# Patient Record
Sex: FEMALE | Race: WHITE | URBAN - NONMETROPOLITAN AREA
[De-identification: names, ages, dates, MRNs, and addresses within clinical notes are randomized per-mention and may not be internally consistent; named-entity substitution may affect disease eponyms.]

---

## 2019-03-27 ENCOUNTER — DOCTOR'S OFFICE (OUTPATIENT)
Dept: URBAN - NONMETROPOLITAN AREA CLINIC 2 | Facility: CLINIC | Age: 34
Setting detail: OPHTHALMOLOGY
End: 2019-03-27
Payer: COMMERCIAL

## 2019-03-27 ENCOUNTER — RX ONLY (RX ONLY)
Age: 34
End: 2019-03-27

## 2019-03-27 DIAGNOSIS — H52.13: ICD-10-CM

## 2019-03-27 DIAGNOSIS — H16.071: ICD-10-CM

## 2019-03-27 PROCEDURE — 92004 COMPRE OPH EXAM NEW PT 1/>: CPT | Performed by: OPTOMETRIST

## 2019-03-27 ASSESSMENT — REFRACTION_MANIFEST
OD_VA3: 20/
OD_VA2: 20/
OS_SPHERE: -2.00
OD_AXIS: 170
OS_VA3: 20/
OS_VA3: 20/
OD_VA1: 20/
OS_VA2: 20/
OU_VA: 20/20
OD_VA1: 20/20
OS_CYLINDER: -1.00
OD_CYLINDER: -1.00
OS_VA1: 20/20
OD_VA2: 20/
OS_AXIS: 175
OD_SPHERE: -2.00
OD_VA3: 20/
OS_VA1: 20/
OU_VA: 20/
OS_VA2: 20/

## 2019-03-27 ASSESSMENT — KERATOMETRY
OD_K2POWER_DIOPTERS: 44.75
OS_AXISANGLE_DEGREES: 180
OS_K2POWER_DIOPTERS: 45.00
OD_AXISANGLE_DEGREES: 175
OD_K1POWER_DIOPTERS: 42.75
OS_K1POWER_DIOPTERS: 42.50

## 2019-03-27 ASSESSMENT — AXIALLENGTH_DERIVED
OS_AL: 24.5084
OD_AL: 24.5084
OS_AL: 24.8278
OD_AL: 24.561

## 2019-03-27 ASSESSMENT — VISUAL ACUITY
OS_BCVA: 20/200
OD_BCVA: 20/200

## 2019-03-27 ASSESSMENT — REFRACTION_AUTOREFRACTION
OS_SPHERE: -2.50
OD_AXIS: 170
OS_CYLINDER: -1.50
OD_CYLINDER: -1.75
OD_SPHERE: -1.75
OS_AXIS: 176

## 2019-03-27 ASSESSMENT — SPHEQUIV_DERIVED
OS_SPHEQUIV: -3.25
OS_SPHEQUIV: -2.5
OD_SPHEQUIV: -2.5
OD_SPHEQUIV: -2.625

## 2019-03-27 ASSESSMENT — REFRACTION_CURRENTRX
OD_OVR_VA: 20/
OD_OVR_VA: 20/
OS_OVR_VA: 20/
OS_OVR_VA: 20/
OD_OVR_VA: 20/
OS_OVR_VA: 20/

## 2019-03-27 ASSESSMENT — CONFRONTATIONAL VISUAL FIELD TEST (CVF)
OS_FINDINGS: FULL
OD_FINDINGS: FULL

## 2019-03-29 ENCOUNTER — OPTICAL OFFICE (OUTPATIENT)
Dept: URBAN - NONMETROPOLITAN AREA CLINIC 5 | Facility: CLINIC | Age: 34
Setting detail: OPHTHALMOLOGY
End: 2019-03-29
Payer: COMMERCIAL

## 2019-03-29 DIAGNOSIS — H52.223: ICD-10-CM

## 2019-03-29 PROCEDURE — V2020 VISION SVCS FRAMES PURCHASES: HCPCS | Performed by: OPTOMETRIST

## 2019-03-29 PROCEDURE — V2103 SPHEROCYLINDR 4.00D/12-2.00D: HCPCS | Performed by: OPTOMETRIST

## 2024-12-26 ENCOUNTER — HOSPITAL ENCOUNTER (EMERGENCY)
Facility: HOSPITAL | Age: 39
Discharge: HOME | End: 2024-12-26
Attending: EMERGENCY MEDICINE
Payer: COMMERCIAL

## 2024-12-26 ENCOUNTER — APPOINTMENT (EMERGENCY)
Dept: RADIOLOGY | Facility: HOSPITAL | Age: 39
End: 2024-12-26
Payer: COMMERCIAL

## 2024-12-26 VITALS
HEART RATE: 81 BPM | RESPIRATION RATE: 20 BRPM | DIASTOLIC BLOOD PRESSURE: 66 MMHG | SYSTOLIC BLOOD PRESSURE: 131 MMHG | TEMPERATURE: 97.5 F | OXYGEN SATURATION: 100 %

## 2024-12-26 DIAGNOSIS — N12 PYELONEPHRITIS: Primary | ICD-10-CM

## 2024-12-26 DIAGNOSIS — N28.1 RENAL CYST: ICD-10-CM

## 2024-12-26 LAB
ALBUMIN SERPL BCG-MCNC: 3.8 G/DL (ref 3.5–5)
ALP SERPL-CCNC: 56 U/L (ref 34–104)
ALT SERPL W P-5'-P-CCNC: 7 U/L (ref 7–52)
ANION GAP SERPL CALCULATED.3IONS-SCNC: 5 MMOL/L (ref 4–13)
AST SERPL W P-5'-P-CCNC: 10 U/L (ref 5–45)
BACTERIA UR QL AUTO: ABNORMAL /HPF
BASOPHILS # BLD AUTO: 0.05 THOUSANDS/ÂΜL (ref 0–0.1)
BASOPHILS NFR BLD AUTO: 1 % (ref 0–1)
BILIRUB SERPL-MCNC: 0.27 MG/DL (ref 0.2–1)
BILIRUB UR QL STRIP: NEGATIVE
BUN SERPL-MCNC: 10 MG/DL (ref 5–25)
CALCIUM SERPL-MCNC: 8.9 MG/DL (ref 8.4–10.2)
CHLORIDE SERPL-SCNC: 105 MMOL/L (ref 96–108)
CLARITY UR: ABNORMAL
CO2 SERPL-SCNC: 28 MMOL/L (ref 21–32)
COLOR UR: YELLOW
CREAT SERPL-MCNC: 0.92 MG/DL (ref 0.6–1.3)
EOSINOPHIL # BLD AUTO: 0.1 THOUSAND/ÂΜL (ref 0–0.61)
EOSINOPHIL NFR BLD AUTO: 1 % (ref 0–6)
ERYTHROCYTE [DISTWIDTH] IN BLOOD BY AUTOMATED COUNT: 16 % (ref 11.6–15.1)
EXT PREGNANCY TEST URINE: NEGATIVE
EXT. CONTROL: NORMAL
GLUCOSE SERPL-MCNC: 83 MG/DL (ref 65–140)
GLUCOSE UR STRIP-MCNC: NEGATIVE MG/DL
HCT VFR BLD AUTO: 31.3 % (ref 36.5–46.1)
HGB BLD-MCNC: 9.3 G/DL (ref 12–15.4)
HGB UR QL STRIP.AUTO: NEGATIVE
IMM GRANULOCYTES # BLD AUTO: 0.03 THOUSAND/UL (ref 0–0.2)
IMM GRANULOCYTES NFR BLD AUTO: 0 % (ref 0–2)
KETONES UR STRIP-MCNC: NEGATIVE MG/DL
LEUKOCYTE ESTERASE UR QL STRIP: ABNORMAL
LIPASE SERPL-CCNC: 52 U/L (ref 11–82)
LYMPHOCYTES # BLD AUTO: 1.51 THOUSANDS/ÂΜL (ref 0.6–4.47)
LYMPHOCYTES NFR BLD AUTO: 19 % (ref 14–44)
MCH RBC QN AUTO: 25.5 PG (ref 26.8–34.3)
MCHC RBC AUTO-ENTMCNC: 29.7 G/DL (ref 31.4–37.4)
MCV RBC AUTO: 86 FL (ref 82–98)
MONOCYTES # BLD AUTO: 0.61 THOUSAND/ÂΜL (ref 0.17–1.22)
MONOCYTES NFR BLD AUTO: 8 % (ref 4–12)
MUCOUS THREADS UR QL AUTO: ABNORMAL
NEUTROPHILS # BLD AUTO: 5.82 THOUSANDS/ÂΜL (ref 1.85–7.62)
NEUTS SEG NFR BLD AUTO: 71 % (ref 43–75)
NITRITE UR QL STRIP: POSITIVE
NON-SQ EPI CELLS URNS QL MICRO: ABNORMAL /HPF
NRBC BLD AUTO-RTO: 0 /100 WBCS
PH UR STRIP.AUTO: 7 [PH]
PLATELET # BLD AUTO: 297 THOUSANDS/UL (ref 149–390)
PMV BLD AUTO: 9.7 FL (ref 8.9–12.7)
POTASSIUM SERPL-SCNC: 4 MMOL/L (ref 3.5–5.3)
PROT SERPL-MCNC: 6.4 G/DL (ref 6.4–8.4)
PROT UR STRIP-MCNC: ABNORMAL MG/DL
RBC # BLD AUTO: 3.65 MILLION/UL (ref 3.88–5.12)
RBC #/AREA URNS AUTO: ABNORMAL /HPF
SODIUM SERPL-SCNC: 138 MMOL/L (ref 135–147)
SP GR UR STRIP.AUTO: 1.02 (ref 1–1.03)
UROBILINOGEN UR STRIP-ACNC: <2 MG/DL
WBC # BLD AUTO: 8.12 THOUSAND/UL (ref 4.31–10.16)
WBC #/AREA URNS AUTO: ABNORMAL /HPF

## 2024-12-26 PROCEDURE — 80053 COMPREHEN METABOLIC PANEL: CPT

## 2024-12-26 PROCEDURE — 85025 COMPLETE CBC W/AUTO DIFF WBC: CPT

## 2024-12-26 PROCEDURE — 74177 CT ABD & PELVIS W/CONTRAST: CPT

## 2024-12-26 PROCEDURE — 99285 EMERGENCY DEPT VISIT HI MDM: CPT | Performed by: EMERGENCY MEDICINE

## 2024-12-26 PROCEDURE — 81025 URINE PREGNANCY TEST: CPT

## 2024-12-26 PROCEDURE — 99284 EMERGENCY DEPT VISIT MOD MDM: CPT

## 2024-12-26 PROCEDURE — 81001 URINALYSIS AUTO W/SCOPE: CPT

## 2024-12-26 PROCEDURE — 83690 ASSAY OF LIPASE: CPT

## 2024-12-26 PROCEDURE — 96375 TX/PRO/DX INJ NEW DRUG ADDON: CPT

## 2024-12-26 PROCEDURE — 36415 COLL VENOUS BLD VENIPUNCTURE: CPT

## 2024-12-26 PROCEDURE — 96365 THER/PROPH/DIAG IV INF INIT: CPT

## 2024-12-26 RX ORDER — CIPROFLOXACIN 500 MG/1
500 TABLET, FILM COATED ORAL EVERY 12 HOURS SCHEDULED
Qty: 14 TABLET | Refills: 0 | Status: SHIPPED | OUTPATIENT
Start: 2024-12-26 | End: 2025-01-02

## 2024-12-26 RX ORDER — KETOROLAC TROMETHAMINE 30 MG/ML
15 INJECTION, SOLUTION INTRAMUSCULAR; INTRAVENOUS ONCE
Status: COMPLETED | OUTPATIENT
Start: 2024-12-26 | End: 2024-12-26

## 2024-12-26 RX ORDER — CIPROFLOXACIN 2 MG/ML
400 INJECTION, SOLUTION INTRAVENOUS ONCE
Status: COMPLETED | OUTPATIENT
Start: 2024-12-26 | End: 2024-12-26

## 2024-12-26 RX ADMIN — CIPROFLOXACIN 400 MG: 2 INJECTION, SOLUTION INTRAVENOUS at 17:17

## 2024-12-26 RX ADMIN — KETOROLAC TROMETHAMINE 15 MG: 30 INJECTION, SOLUTION INTRAMUSCULAR; INTRAVENOUS at 18:35

## 2024-12-26 RX ADMIN — IOHEXOL 85 ML: 350 INJECTION, SOLUTION INTRAVENOUS at 16:26

## 2024-12-26 NOTE — DISCHARGE INSTRUCTIONS
An incidental finding was noted on your CT.  The majority of renal cysts are benign (not dangerous), however the radiologist has interpreted your CT as:    Cystic lesion with enhancing, thickened septum in the upper pole the right kidney measuring 2 cm. Recommend nonemergent MRI of the abdomen with gadolinium. No obstructive uropathy.     Therefore we recommend that you follow-up with your primary care physician for outpatient abdomen and pelvis MRI.    Please take the prescribed abx and come back If you have any new concerning symptoms.

## 2024-12-26 NOTE — ED PROVIDER NOTES
ED Disposition       None          Assessment & Plan   {Hyperlinks  Risk Stratification - NIHSS - HEART SCORE - Fill out sepsis note and make sure you call [phone] if severe or septic shock:[id number]}    Medical Decision Making  Amount and/or Complexity of Data Reviewed  Labs: ordered. Decision-making details documented in ED Course.  Radiology: ordered.        ED Course as of 24 1542   Thu Dec 26, 2024   1521 Nitrite, UA(!): Positive   1527 Bacteria, UA: Occasional       Medications - No data to display    ED Risk Strat Scores                                              History of Present Illness   {Hyperlinks  History (Med, Surg, Fam, Social) - Current Medications - Allergies  :[id number]}    Chief Complaint   Patient presents with    Flank Pain     Having right sided flank pain that radiates to abdomen for one week. H/o kidney stones.        History reviewed. No pertinent past medical history.   Past Surgical History:   Procedure Laterality Date     SECTION      HERNIA REPAIR        History reviewed. No pertinent family history.   Social History     Tobacco Use    Smoking status: Every Day     Types: Cigarettes    Smokeless tobacco: Never   Substance Use Topics    Alcohol use: Yes     Comment: occassional    Drug use: Yes     Types: Marijuana      E-Cigarette/Vaping      E-Cigarette/Vaping Substances      I have reviewed and agree with the history as documented.     HPI    Review of Systems        Objective   {Hyperlinks  Historical Vitals - Historical Labs - Chart Review/Microbiology - Last Echo - Code Status  :[id number]}    ED Triage Vitals [24 1325]   Temperature Pulse Blood Pressure Respirations SpO2 Patient Position - Orthostatic VS   97.5 °F (36.4 °C) 81 131/66 20 100 % Sitting      Temp Source Heart Rate Source BP Location FiO2 (%) Pain Score    Temporal Monitor Left arm -- --      Vitals      Date and Time Temp Pulse SpO2 Resp BP Pain Score FACES Pain Rating User   24 1325  97.5 °F (36.4 °C) 81 100 % 20 131/66 -- -- ST            Physical Exam    Results Reviewed       None            No orders to display       POC Biliary US    Date/Time: 12/26/2024 3:32 PM    Performed by: Jeremias Márquez MD  Authorized by: Jeremias Márquez MD    Patient location:  ED  Performed by:  Resident  Other Assisting Provider: Yes (comment)    Procedure details:     Exam Type:  Diagnostic and educational    Indications: upper right quadrant abdominal pain and back pain      Assessment for:  Cholecystitis and cholelithiasis    Views obtained: gallbladder (transverse and longitudinal), liver, common bile duct and portal triad      Image quality: limited diagnostic      Image availability:  Images available in PACS  Findings:     Cholelithiasis: identified      Common bile duct:  Abnormal    Choledocholithiasis: not identified      Gallbladder wall:  Normal    Pericholecystic fluid: not identified      Sonographic Cerda's sign: positive      Polyps: not identified      Mass: not identified    Interpretation:     Biliary ultrasound impressions: cholelithiasis        ED Medication and Procedure Management   None     Patient's Medications    No medications on file     No discharge procedures on file.  ED SEPSIS DOCUMENTATION                History reviewed. No pertinent past medical history.   Past Surgical History:   Procedure Laterality Date     SECTION      HERNIA REPAIR        History reviewed. No pertinent family history.   Social History     Tobacco Use    Smoking status: Every Day     Types: Cigarettes    Smokeless tobacco: Never   Substance Use Topics    Alcohol use: Yes     Comment: occassional    Drug use: Yes     Types: Marijuana      E-Cigarette/Vaping      E-Cigarette/Vaping Substances      I have reviewed and agree with the history as documented.     39-year-old woman with significant history for pyelonephritis in the past coming in with similar symptoms including right CVA tenderness nausea vomiting and intermittent fever.        Review of Systems   Constitutional:  Positive for fever. Negative for appetite change, chills, diaphoresis and fatigue.   HENT:  Negative for ear pain and sore throat.    Eyes:  Negative for pain and visual disturbance.   Respiratory:  Negative for cough and shortness of breath.    Cardiovascular:  Negative for chest pain and palpitations.   Gastrointestinal:  Positive for abdominal pain, nausea and vomiting. Negative for constipation and diarrhea.   Genitourinary:  Negative for dysuria and hematuria.   Musculoskeletal:  Negative for arthralgias and back pain.   Skin:  Negative for color change and rash.   Neurological:  Negative for seizures and syncope.   All other systems reviewed and are negative.          Objective       ED Triage Vitals   Temperature Pulse Blood Pressure Respirations SpO2 Patient Position - Orthostatic VS   24 1325 24 1325 24 1325 24 1325 24 1325 24 1325   97.5 °F (36.4 °C) 81 131/66 20 100 % Sitting      Temp Source Heart Rate Source BP Location FiO2 (%) Pain Score    24 1325 24 1325 24 1325 -- 24 1835    Temporal Monitor Left arm  3      Vitals      Date and Time Temp Pulse SpO2 Resp BP Pain Score FACES Pain  Rating User   12/26/24 1835 -- -- -- -- -- 3 -- JK   12/26/24 1325 97.5 °F (36.4 °C) 81 100 % 20 131/66 -- -- ST            Physical Exam  Vitals and nursing note reviewed.   Constitutional:       General: Cuca is not in acute distress.     Appearance: Cuca is well-developed.   HENT:      Head: Normocephalic and atraumatic.      Nose: No congestion or rhinorrhea.   Eyes:      Extraocular Movements: Extraocular movements intact.      Conjunctiva/sclera: Conjunctivae normal.      Pupils: Pupils are equal, round, and reactive to light.   Cardiovascular:      Rate and Rhythm: Normal rate and regular rhythm.      Heart sounds: No murmur heard.  Pulmonary:      Effort: Pulmonary effort is normal. No respiratory distress.      Breath sounds: Normal breath sounds.   Abdominal:      Palpations: Abdomen is soft.      Tenderness: There is abdominal tenderness in the right upper quadrant and epigastric area. There is right CVA tenderness. There is no left CVA tenderness, guarding or rebound. Positive signs include Cerda's sign. Negative signs include Rovsing's sign, McBurney's sign, psoas sign and obturator sign.      Hernia: No hernia is present.   Musculoskeletal:         General: No swelling.      Cervical back: Neck supple.   Skin:     General: Skin is warm and dry.      Capillary Refill: Capillary refill takes less than 2 seconds.      Coloration: Skin is not pale.      Findings: No erythema.   Neurological:      Mental Status: Cuca is alert and oriented to person, place, and time. Mental status is at baseline.      Cranial Nerves: No cranial nerve deficit.      Sensory: No sensory deficit.      Motor: No weakness.   Psychiatric:         Mood and Affect: Mood normal.         Results Reviewed       Procedure Component Value Units Date/Time    POCT pregnancy, urine [[id number]]  (Normal) Collected: 12/26/24 1532    Lab Status: Final result Updated: 12/26/24 1532     EXT Preg Test, Ur Negative     Control Valid    Urine  Microscopic [[id number]]  (Abnormal) Collected: 12/26/24 1510    Lab Status: Final result Specimen: Urine, Clean Catch Updated: 12/26/24 1522     RBC, UA None Seen /hpf      WBC, UA 2-4 /hpf      Epithelial Cells Occasional /hpf      Bacteria, UA Occasional /hpf      MUCUS THREADS Occasional    UA w Reflex to Microscopic w Reflex to Culture [[id number]]  (Abnormal) Collected: 12/26/24 1510    Lab Status: Final result Specimen: Urine, Clean Catch Updated: 12/26/24 1520     Color, UA Yellow     Clarity, UA Turbid     Specific Gravity, UA 1.019     pH, UA 7.0     Leukocytes, UA Trace     Nitrite, UA Positive     Protein, UA Trace mg/dl      Glucose, UA Negative mg/dl      Ketones, UA Negative mg/dl      Urobilinogen, UA <2.0 mg/dl      Bilirubin, UA Negative     Occult Blood, UA Negative    Comprehensive metabolic panel [[id number]] Collected: 12/26/24 1439    Lab Status: Final result Specimen: Blood from Arm, Left Updated: 12/26/24 1510     Sodium 138 mmol/L      Potassium 4.0 mmol/L      Chloride 105 mmol/L      CO2 28 mmol/L      ANION GAP 5 mmol/L      BUN 10 mg/dL      Creatinine 0.92 mg/dL      Glucose 83 mg/dL      Calcium 8.9 mg/dL      AST 10 U/L      ALT 7 U/L      Alkaline Phosphatase 56 U/L      Total Protein 6.4 g/dL      Albumin 3.8 g/dL      Total Bilirubin 0.27 mg/dL      eGFR --    Narrative:      Notes:     1. eGFR calculation is only valid for adults 18 years and older.  2. EGFR calculation cannot be performed for patients who are transgender, non-binary, or whose legal sex, sex at birth, and gender identity differ.    Lipase [[id number]]  (Normal) Collected: 12/26/24 1439    Lab Status: Final result Specimen: Blood from Arm, Left Updated: 12/26/24 1510     Lipase 52 u/L     CBC and differential [[id number]]  (Abnormal) Collected: 12/26/24 1439    Lab Status: Final result Specimen: Blood from Arm, Left Updated: 12/26/24 1458     WBC 8.12 Thousand/uL      RBC 3.65 Million/uL      Hemoglobin 9.3 g/dL       Hematocrit 31.3 %      MCV 86 fL      MCH 25.5 pg      MCHC 29.7 g/dL      RDW 16.0 %      MPV 9.7 fL      Platelets 297 Thousands/uL      nRBC 0 /100 WBCs      Segmented % 71 %      Immature Grans % 0 %      Lymphocytes % 19 %      Monocytes % 8 %      Eosinophils Relative 1 %      Basophils Relative 1 %      Absolute Neutrophils 5.82 Thousands/µL      Absolute Immature Grans 0.03 Thousand/uL      Absolute Lymphocytes 1.51 Thousands/µL      Absolute Monocytes 0.61 Thousand/µL      Eosinophils Absolute 0.10 Thousand/µL      Basophils Absolute 0.05 Thousands/µL             CT abdomen pelvis w contrast   Final Interpretation by Tacos David MD (12/26 8287)         1. Cystic lesion with enhancing, thickened septum in the upper pole the right kidney measuring 2 cm. Recommend nonemergent MRI of the abdomen with gadolinium. No obstructive uropathy.   2. Findings compatible with constipation. No evidence of bowel obstruction, colitis or diverticulitis. Normal appendix.   3. Partially collapsed follicle in the right adnexa. No pathologic cyst or mass. No fluid or inflammation in the pelvis.         Workstation performed: [id number]             POC Biliary US    Date/Time: 12/26/2024 3:32 PM    Performed by: Jeremias Márquez MD  Authorized by: Jeremias Márquez MD    Patient location:  ED  Performed by:  Resident  Other Assisting Provider: Yes (comment)    Procedure details:     Exam Type:  Diagnostic and educational    Indications: upper right quadrant abdominal pain and back pain      Assessment for:  Cholecystitis and cholelithiasis    Views obtained: gallbladder (transverse and longitudinal), liver, common bile duct and portal triad      Image quality: limited diagnostic      Image availability:  Images available in PACS  Findings:     Cholelithiasis: identified      Common bile duct:  Abnormal    Choledocholithiasis: not identified      Gallbladder wall:  Normal    Pericholecystic fluid: not identified      Sonographic  Cerda's sign: positive      Polyps: not identified      Mass: not identified    Interpretation:     Biliary ultrasound impressions: cholelithiasis        ED Medication and Procedure Management   None     Discharge Medication List as of 12/26/2024  6:28 PM        START taking these medications    Details   ciprofloxacin (CIPRO) 500 mg tablet Take 1 tablet (500 mg total) by mouth every 12 (twelve) hours for 7 days, Starting Thu 12/26/2024, Until Thu 1/2/2025, Print           No discharge procedures on file.  ED SEPSIS DOCUMENTATION   Time reflects when diagnosis was documented in both MDM as applicable and the Disposition within this note       Time User Action Codes Description Comment    12/26/2024  6:26 PM Jeremias Márquez [N12] Pyelonephritis     12/26/2024  6:26 PM Jeremias Márquez [N28.1] Renal cyst                  Jeremias Márquez MD  12/30/24 0978

## 2024-12-26 NOTE — ED ATTENDING ATTESTATION
12/26/2024  I, Vincenzo Zuleta MD, saw and evaluated the patient. I have discussed the patient with the resident/non-physician practitioner and agree with the resident's/non-physician practitioner's findings, Plan of Care, and MDM as documented in the resident's/non-physician practitioner's note, except where noted. All available labs and Radiology studies were reviewed.  I was present for key portions of any procedure(s) performed by the resident/non-physician practitioner and I was immediately available to provide assistance.       At this point I agree with the current assessment done in the Emergency Department.  I have conducted an independent evaluation of this patient a history and physical is as follows:    ED Course     39-year-old female, history of pyelonephritis, presenting to the emergency department for evaluation of right flank pain radiating to right upper quadrant getting acutely worse over the past day.  Patient states that original illness started a week ago with fever.  Patient reports that she is having a sensation like she is needing to force her urine out.  Nausea and 1 episode of vomiting yesterday.  No diarrhea.    Patient appears clinically nontoxic.  Head is normocephalic atraumatic.  Eyelids lashes normal.  Lungs clear bilaterally.  Heart regular rate and rhythm with no murmurs rubs or gallops.  Abdomen is nondistended, soft, with tenderness to palpation in the right upper quadrant.  Right flank tenderness.  Right lower quadrant is nontender.  Extremities unremarkable.  GCS 15.  Cranial nerves II through XII are intact.    MEDICAL DECISION MAKING    Number and Complexity of Problems  Differential diagnosis: Pyelonephritis, symptomatic nephrolithiasis, acute cholecystitis, symptomatic cholelithiasis, choledocholithiasis.    Medical Decision Making Data  External documents reviewed: Reviewed CT from [facility] on May 5, 2024.  Patient found with dilated common bile duct.  Patient had  Rebekah lithiasis.  Hepatomegaly was noted.  Patient had CT evidence consistent with pyelonephritis.  My CT interpretation: Noted cystic lesion in the upper pole of the right kidney.      CT abdomen pelvis w contrast   Final Result         1. Cystic lesion with enhancing, thickened septum in the upper pole the right kidney measuring 2 cm. Recommend nonemergent MRI of the abdomen with gadolinium. No obstructive uropathy.   2. Findings compatible with constipation. No evidence of bowel obstruction, colitis or diverticulitis. Normal appendix.   3. Partially collapsed follicle in the right adnexa. No pathologic cyst or mass. No fluid or inflammation in the pelvis.         Workstation performed: [facility]             Labs Reviewed   CBC AND DIFFERENTIAL - Abnormal       Result Value Ref Range Status    WBC 8.12  4.31 - 10.16 Thousand/uL Final    RBC 3.65 (*) 3.88 - 5.12 Million/uL Final    Hemoglobin 9.3 (*) 12.0 - 15.4 g/dL Final    Hematocrit 31.3 (*) 36.5 - 46.1 % Final    MCV 86  82 - 98 fL Final    MCH 25.5 (*) 26.8 - 34.3 pg Final    MCHC 29.7 (*) 31.4 - 37.4 g/dL Final    RDW 16.0 (*) 11.6 - 15.1 % Final    MPV 9.7  8.9 - 12.7 fL Final    Platelets 297  149 - 390 Thousands/uL Final    nRBC 0  /100 WBCs Final    Segmented % 71  43 - 75 % Final    Immature Grans % 0  0 - 2 % Final    Lymphocytes % 19  14 - 44 % Final    Monocytes % 8  4 - 12 % Final    Eosinophils Relative 1  0 - 6 % Final    Basophils Relative 1  0 - 1 % Final    Absolute Neutrophils 5.82  1.85 - 7.62 Thousands/µL Final    Absolute Immature Grans 0.03  0.00 - 0.20 Thousand/uL Final    Absolute Lymphocytes 1.51  0.60 - 4.47 Thousands/µL Final    Absolute Monocytes 0.61  0.17 - 1.22 Thousand/µL Final    Eosinophils Absolute 0.10  0.00 - 0.61 Thousand/µL Final    Basophils Absolute 0.05  0.00 - 0.10 Thousands/µL Final   UA W REFLEX TO MICROSCOPIC WITH REFLEX TO CULTURE - Abnormal    Color, UA Yellow   Final    Clarity, UA Turbid   Final    Specific  Gravity, UA 1.019  1.003 - 1.030 Final    pH, UA 7.0  4.5, 5.0, 5.5, 6.0, 6.5, 7.0, 7.5, 8.0 Final    Leukocytes, UA Trace (*) Negative Final    Nitrite, UA Positive (*) Negative Final    Protein, UA Trace (*) Negative mg/dl Final    Glucose, UA Negative  Negative mg/dl Final    Ketones, UA Negative  Negative mg/dl Final    Urobilinogen, UA <2.0  <2.0 mg/dl mg/dl Final    Bilirubin, UA Negative  Negative Final    Occult Blood, UA Negative  Negative Final   URINE MICROSCOPIC - Abnormal    RBC, UA None Seen  None Seen, 1-2 /hpf Final    WBC, UA 2-4 (*) None Seen, 1-2 /hpf Final    Epithelial Cells Occasional  None Seen, Occasional /hpf Final    Bacteria, UA Occasional  None Seen, Occasional /hpf Final    MUCUS THREADS Occasional (*) None Seen Final   LIPASE - Normal    Lipase 52  11 - 82 u/L Final   POCT PREGNANCY, URINE - Normal    EXT Preg Test, Ur Negative   Final    Control Valid   Final   COMPREHENSIVE METABOLIC PANEL    Sodium 138  135 - 147 mmol/L Final    Potassium 4.0  3.5 - 5.3 mmol/L Final    Chloride 105  96 - 108 mmol/L Final    CO2 28  21 - 32 mmol/L Final    ANION GAP 5  4 - 13 mmol/L Final    BUN 10  5 - 25 mg/dL Final    Creatinine 0.92  0.60 - 1.30 mg/dL Final    Comment: Standardized to IDMS reference method    Glucose 83  65 - 140 mg/dL Final    Comment: If the patient is fasting, the ADA then defines impaired fasting glucose as > 100 mg/dL and diabetes as > or equal to 123 mg/dL.    Calcium 8.9  8.4 - 10.2 mg/dL Final    AST 10  5 - 45 U/L Final    ALT 7  7 - 52 U/L Final    Comment: Specimen collection should occur prior to Sulfasalazine administration due to the potential for falsely depressed results.     Alkaline Phosphatase 56  34 - 104 U/L Final    Total Protein 6.4  6.4 - 8.4 g/dL Final    Albumin 3.8  3.5 - 5.0 g/dL Final    Total Bilirubin 0.27  0.20 - 1.00 mg/dL Final    Comment: Use of this assay is not recommended for patients undergoing treatment with eltrombopag due to the potential  for falsely elevated results.  N-acetyl-p-benzoquinone imine (metabolite of Acetaminophen) will generate erroneously low results in samples for patients that have taken an overdose of Acetaminophen.    eGFR     Final    Narrative:     Notes:     1. eGFR calculation is only valid for adults 18 years and older.  2. EGFR calculation cannot be performed for patients who are transgender, non-binary, or whose legal sex, sex at birth, and gender identity differ.       Labs reviewed by me are significant for:  Urine dip positive.  Microscopy less impressive.      Treatment and Disposition  ED course: Patient declined pain medication in the emergency department.  Patient underwent evaluation with CT abdomen and pelvis.  No identifiable source of right sided abdominal pain.  Patient will be treated for urinary tract infection with follow-up with primary care.  Patient made aware of septated right upper pole renal cyst with need for outpatient MRI.  Shared decision making: Agreeable with plan.  Code status: Full code.              Critical Care Time  Procedures

## 2024-12-26 NOTE — INCIDENTAL FINDINGS
The following findings require follow up:  Radiographic finding   Finding: Cystic lesion with enhancing, thickened septum in the upper pole the right kidney measuring 2 cm. Recommend nonemergent MRI of the abdomen with gadolinium. No obstructive uropathy.    Follow up required: MRI   Follow up should be done within 1 month(s)    Please notify the following clinician to assist with the follow up:   Dr. Jerri Suárez    Incidental finding results were discussed with the Patient by Vincenzo Zuleta MD on 12/26/24.   They expressed understanding and all questions answered.